# Patient Record
Sex: FEMALE
[De-identification: names, ages, dates, MRNs, and addresses within clinical notes are randomized per-mention and may not be internally consistent; named-entity substitution may affect disease eponyms.]

---

## 2017-01-19 ENCOUNTER — CHARTING TRANS (OUTPATIENT)
Dept: OTHER | Age: 42
End: 2017-01-19

## 2017-03-30 PROCEDURE — 81001 URINALYSIS AUTO W/SCOPE: CPT | Performed by: PHYSICIAN ASSISTANT

## 2017-03-30 PROCEDURE — 87086 URINE CULTURE/COLONY COUNT: CPT | Performed by: PHYSICIAN ASSISTANT

## 2017-04-15 ENCOUNTER — HOSPITAL ENCOUNTER (EMERGENCY)
Facility: HOSPITAL | Age: 42
Discharge: HOME | End: 2017-04-16
Attending: EMERGENCY MEDICINE
Payer: COMMERCIAL

## 2017-04-15 DIAGNOSIS — R29.898 LEFT ARM WEAKNESS: Primary | ICD-10-CM

## 2017-04-15 PROCEDURE — 85610 PROTHROMBIN TIME: CPT | Performed by: EMERGENCY MEDICINE

## 2017-04-15 PROCEDURE — 85025 COMPLETE CBC W/AUTO DIFF WBC: CPT | Performed by: EMERGENCY MEDICINE

## 2017-04-15 PROCEDURE — 82962 GLUCOSE BLOOD TEST: CPT

## 2017-04-15 PROCEDURE — 99285 EMERGENCY DEPT VISIT HI MDM: CPT

## 2017-04-15 PROCEDURE — 80053 COMPREHEN METABOLIC PANEL: CPT | Performed by: EMERGENCY MEDICINE

## 2017-04-15 PROCEDURE — 85730 THROMBOPLASTIN TIME PARTIAL: CPT | Performed by: EMERGENCY MEDICINE

## 2017-04-15 PROCEDURE — 96374 THER/PROPH/DIAG INJ IV PUSH: CPT

## 2017-04-15 PROCEDURE — 93010 ELECTROCARDIOGRAM REPORT: CPT

## 2017-04-15 PROCEDURE — 93005 ELECTROCARDIOGRAM TRACING: CPT

## 2017-04-15 RX ORDER — ASPIRIN 81 MG/1
324 TABLET, CHEWABLE ORAL ONCE
Status: COMPLETED | OUTPATIENT
Start: 2017-04-15 | End: 2017-04-15

## 2017-04-16 ENCOUNTER — APPOINTMENT (OUTPATIENT)
Dept: MRI IMAGING | Facility: HOSPITAL | Age: 42
End: 2017-04-16
Attending: EMERGENCY MEDICINE
Payer: COMMERCIAL

## 2017-04-16 VITALS
RESPIRATION RATE: 14 BRPM | HEART RATE: 78 BPM | HEIGHT: 70 IN | SYSTOLIC BLOOD PRESSURE: 122 MMHG | OXYGEN SATURATION: 99 % | DIASTOLIC BLOOD PRESSURE: 76 MMHG | TEMPERATURE: 98 F | BODY MASS INDEX: 24.34 KG/M2 | WEIGHT: 170 LBS

## 2017-04-16 PROCEDURE — A9575 INJ GADOTERATE MEGLUMI 0.1ML: HCPCS | Performed by: EMERGENCY MEDICINE

## 2017-04-16 PROCEDURE — 70549 MR ANGIOGRAPH NECK W/O&W/DYE: CPT

## 2017-04-16 PROCEDURE — 70553 MRI BRAIN STEM W/O & W/DYE: CPT

## 2017-04-16 PROCEDURE — 70546 MR ANGIOGRAPH HEAD W/O&W/DYE: CPT

## 2017-04-16 RX ORDER — LORAZEPAM 2 MG/ML
1 INJECTION INTRAMUSCULAR ONCE
Status: COMPLETED | OUTPATIENT
Start: 2017-04-16 | End: 2017-04-16

## 2017-04-16 NOTE — ED NOTES
Discharge instructions were reviewed and pt verbalized understanding. Pt is awake, alert, oriented, ambulatory, and denies any pain/discomfort. Pt refuses to be transported out of the ED via wheelchair.  Pt has steady gait, denies any numbness/tingling/weak

## 2017-04-16 NOTE — ED INITIAL ASSESSMENT (HPI)
Pt reports washing dishes 30 min ago and feeling a sudden weakness in the left hand and numbness in the left arm, left side of the face, and left leg. She reports the symptoms lasted for 5 seconds. Pt states her symptoms are completely resolved.

## 2017-04-16 NOTE — ED PROVIDER NOTES
Patient Seen in: [facility] Emergency Department    History   Patient presents with:  Numbness Weakness (neurologic)    Stated Complaint:     HPI    Patient states that just before coming to the hospital she was at home holding an object in the left 04/15/17 2234 82   Resp 04/15/17 2234 10   Temp 04/15/17 2234 98.3 °F (36.8 °C)   Temp src 04/15/17 2234 Temporal   SpO2 04/15/17 2234 100 %   O2 Device 04/15/17 2234 None (Room air)       Current:/76 mmHg  Pulse 78  Temp(Src) 98.2 °F (36.8 °C) (Temp Final result                 Please view results for these tests on the individual orders.    URINALYSIS WITH CULTURE REFLEX   POCT PREGNANCY, URINE   RAINBOW DRAW BLUE   RAINBOW DRAW GOLD   RAINBOW DRAW LAVENDER   RAINBOW DRAW L

## 2017-04-17 ENCOUNTER — TELEPHONE (OUTPATIENT)
Dept: NEUROLOGY | Facility: CLINIC | Age: 42
End: 2017-04-17

## 2017-04-17 NOTE — TELEPHONE ENCOUNTER
wanting an appointment today for patient who was is ED 4/15/17 for left arm weakness. Informed  there are no openings today.  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  Apologized that we will not be able to a

## 2017-04-18 NOTE — TELEPHONE ENCOUNTER
Spoke with patient's  ([id number] Mohini Falrey per [employer]). Relayed below. Offered appt with Dr Alvarado Nip, Wed, 4/19/17, 3:20 pm in the [facility] office. He accepted appointment. Patient scheduled.

## 2017-04-24 ENCOUNTER — LAB REQUISITION (OUTPATIENT)
Dept: LAB | Facility: HOSPITAL | Age: 42
End: 2017-04-24
Payer: COMMERCIAL

## 2017-04-24 ENCOUNTER — APPOINTMENT (OUTPATIENT)
Dept: LAB | Facility: HOSPITAL | Age: 42
End: 2017-04-24
Attending: Other
Payer: COMMERCIAL

## 2017-04-24 DIAGNOSIS — G45.9 TRANSIENT CEREBRAL ISCHEMIA, UNSPECIFIED TYPE: ICD-10-CM

## 2017-04-24 PROCEDURE — 83036 HEMOGLOBIN GLYCOSYLATED A1C: CPT

## 2017-04-24 PROCEDURE — 85613 RUSSELL VIPER VENOM DILUTED: CPT

## 2017-04-24 PROCEDURE — 85652 RBC SED RATE AUTOMATED: CPT

## 2017-04-24 PROCEDURE — 36415 COLL VENOUS BLD VENIPUNCTURE: CPT

## 2017-04-24 PROCEDURE — 86038 ANTINUCLEAR ANTIBODIES: CPT

## 2017-04-24 PROCEDURE — 81241 F5 GENE: CPT

## 2017-04-24 PROCEDURE — 85610 PROTHROMBIN TIME: CPT

## 2017-04-24 PROCEDURE — 86146 BETA-2 GLYCOPROTEIN ANTIBODY: CPT

## 2017-04-24 PROCEDURE — 86255 FLUORESCENT ANTIBODY SCREEN: CPT

## 2017-04-24 PROCEDURE — 86147 CARDIOLIPIN ANTIBODY EA IG: CPT

## 2017-04-24 PROCEDURE — 86141 C-REACTIVE PROTEIN HS: CPT

## 2017-04-24 PROCEDURE — 80061 LIPID PANEL: CPT

## 2017-04-24 NOTE — PROGRESS NOTES
Quick Note:    Hi Nanda,    Your hbA1C, lipid panel, and inflammatory markers came back normal. I am still awaiting the results of your other bloodwork.     Thanks  Dr. Robert Haro  ______

## 2017-04-26 NOTE — PROGRESS NOTES
Quick Note:    Doni Vee    Your HEATH came back normal. I am awaiting the results of your hypercoaguable workup.      Thanks  Dr. Chaim Castleman  ______

## 2017-04-27 NOTE — PROGRESS NOTES
Quick Note:    Doni Jasmine,    The rest of your hypercoaguable workup came back negative. There is no evidence of Factor V Leiden or Antiphospholipid Ab syndrome. Your ANCA testing was also negative.      Thanks  Dr. Raúl Sorenson  ______

## 2018-01-08 ENCOUNTER — OFFICE VISIT (OUTPATIENT)
Dept: OBGYN CLINIC | Facility: CLINIC | Age: 43
End: 2018-01-08

## 2018-01-08 VITALS
WEIGHT: 184 LBS | DIASTOLIC BLOOD PRESSURE: 70 MMHG | HEART RATE: 72 BPM | SYSTOLIC BLOOD PRESSURE: 124 MMHG | BODY MASS INDEX: 26.34 KG/M2 | HEIGHT: 70 IN | RESPIRATION RATE: 16 BRPM

## 2018-01-08 DIAGNOSIS — Z01.419 WELL WOMAN EXAM WITH ROUTINE GYNECOLOGICAL EXAM: Primary | ICD-10-CM

## 2018-01-08 DIAGNOSIS — Z12.39 SCREENING BREAST EXAMINATION: ICD-10-CM

## 2018-01-08 PROBLEM — Z30.09 EVALUATION REGARDING CONTRACEPTION OPTIONS: Status: ACTIVE | Noted: 2018-01-08

## 2018-01-08 PROCEDURE — 99396 PREV VISIT EST AGE 40-64: CPT | Performed by: OBSTETRICS & GYNECOLOGY

## 2018-01-08 PROCEDURE — 88175 CYTOPATH C/V AUTO FLUID REDO: CPT | Performed by: OBSTETRICS & GYNECOLOGY

## 2018-01-08 NOTE — PROGRESS NOTES
Sarah Cross is a 40year old female  Patient's last menstrual period was 2017 (approximate). Patient presents with:  Wellness Visit: annual. discuss BC  . Birthing control discussed.   She wishes to have a ParaGard IUD after her next juan as needed for Pain., Disp: , Rfl:     ALLERGIES:  No Known Allergies      Review of Systems:  Constitutional:  Denies fatigue, night sweats, hot flashes  Gastrointestinal:  denies heartburn, abdominal pain, diarrhea or constipation  Genitourinary:  denies

## 2018-01-10 LAB — LAST PAP RESULT: NORMAL

## 2018-11-23 ENCOUNTER — IMAGING SERVICES (OUTPATIENT)
Dept: OTHER | Age: 43
End: 2018-11-23

## 2019-02-21 ENCOUNTER — TELEPHONE (OUTPATIENT)
Dept: OBGYN CLINIC | Facility: CLINIC | Age: 44
End: 2019-02-21

## 2019-02-21 ENCOUNTER — OFFICE VISIT (OUTPATIENT)
Dept: OBGYN CLINIC | Facility: CLINIC | Age: 44
End: 2019-02-21
Payer: COMMERCIAL

## 2019-02-21 ENCOUNTER — LAB ENCOUNTER (OUTPATIENT)
Dept: LAB | Age: 44
End: 2019-02-21
Attending: OBSTETRICS & GYNECOLOGY
Payer: COMMERCIAL

## 2019-02-21 VITALS
HEIGHT: 70 IN | BODY MASS INDEX: 26.48 KG/M2 | WEIGHT: 185 LBS | HEART RATE: 85 BPM | SYSTOLIC BLOOD PRESSURE: 118 MMHG | DIASTOLIC BLOOD PRESSURE: 72 MMHG

## 2019-02-21 DIAGNOSIS — N92.6 MISSED MENSES: ICD-10-CM

## 2019-02-21 DIAGNOSIS — N92.6 IRREGULAR MENSES: ICD-10-CM

## 2019-02-21 DIAGNOSIS — N92.6 MISSED MENSES: Primary | ICD-10-CM

## 2019-02-21 LAB
B-HCG SERPL-ACNC: <1 MIU/ML
CONTROL LINE PRESENT WITH A CLEAR BACKGROUND (YES/NO): YES YES/NO
PREGNANCY TEST, URINE: NEGATIVE
TSI SER-ACNC: 2.49 MIU/ML (ref 0.36–3.74)

## 2019-02-21 PROCEDURE — 84702 CHORIONIC GONADOTROPIN TEST: CPT | Performed by: OBSTETRICS & GYNECOLOGY

## 2019-02-21 PROCEDURE — 99212 OFFICE O/P EST SF 10 MIN: CPT | Performed by: OBSTETRICS & GYNECOLOGY

## 2019-02-21 PROCEDURE — 84443 ASSAY THYROID STIM HORMONE: CPT | Performed by: OBSTETRICS & GYNECOLOGY

## 2019-02-21 PROCEDURE — 81025 URINE PREGNANCY TEST: CPT | Performed by: OBSTETRICS & GYNECOLOGY

## 2019-02-21 NOTE — PROGRESS NOTES
Her period is usually every 24 days she had unprotected intercourse and took emergency contraceptive 4 hours after intercourse. She usually uses condoms.   Has a family history of ovarian and uterine cancer does not wish to have any hormonal methods for bi

## 2019-02-25 ENCOUNTER — TELEPHONE (OUTPATIENT)
Dept: OBGYN CLINIC | Facility: CLINIC | Age: 44
End: 2019-02-25

## 2019-02-26 NOTE — TELEPHONE ENCOUNTER
Patient aware of normal results. Patient would like to know if there is medication she should take since her LMP was 1/12/2019 and her HCG is negative to induce her menses. Explained to patient that after taking Plan B her menses might be irregular.  Will c

## 2020-11-03 ENCOUNTER — HOSPITAL ENCOUNTER (OUTPATIENT)
Age: 45
Discharge: HOME | End: 2020-11-03
Payer: COMMERCIAL

## 2020-11-03 VITALS
OXYGEN SATURATION: 100 % | SYSTOLIC BLOOD PRESSURE: 158 MMHG | TEMPERATURE: 98 F | RESPIRATION RATE: 16 BRPM | HEART RATE: 77 BPM | DIASTOLIC BLOOD PRESSURE: 94 MMHG

## 2020-11-03 DIAGNOSIS — J02.9 SORE THROAT: ICD-10-CM

## 2020-11-03 DIAGNOSIS — Z20.822 EXPOSURE TO COVID-19 VIRUS: Primary | ICD-10-CM

## 2020-11-03 PROCEDURE — 87086 URINE CULTURE/COLONY COUNT: CPT | Performed by: PHYSICIAN ASSISTANT

## 2020-11-03 PROCEDURE — 99214 OFFICE O/P EST MOD 30 MIN: CPT

## 2020-11-03 PROCEDURE — 81002 URINALYSIS NONAUTO W/O SCOPE: CPT | Performed by: PHYSICIAN ASSISTANT

## 2020-11-03 PROCEDURE — 99203 OFFICE O/P NEW LOW 30 MIN: CPT

## 2020-11-03 RX ORDER — TAMSULOSIN HYDROCHLORIDE 0.4 MG/1
CAPSULE ORAL DAILY
COMMUNITY

## 2020-11-03 NOTE — ED INITIAL ASSESSMENT (HPI)
Patient presents to [address] [place] [address] for testing. 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 Worked last weekend. Sore throat started last night.

## 2020-11-03 NOTE — ED INITIAL ASSESSMENT (HPI)
Late entry patient with urinary frequency. Urine obtained and cx sent. Given filter.h/o kidney stones. Will f/u with her pcp

## 2020-11-03 NOTE — ED PROVIDER NOTES
Patient Seen in: [facility]      History   Patient presents with:  Sore Throat  Testing    Stated Complaint: sore throat,test    HPI    Mónica Albert is a 21-year-old female who presents for evaluation of sore throat.   She has a past medial hist Oral   SpO2 100 %   O2 Device None (Room air)       Current:BP (!) 182/101   Pulse 86   Temp 98.3 °F (36.8 °C) (Oral)   Resp 18   LMP 10/13/2020 (Approximate)   SpO2 100%         Physical Exam  Nursing note reviewed. Vital signs reviewed.     Constitutional negative, the patient may continue normal social distancing practices without being in quarantine. The patient is encouraged to return if any concerning symptoms arise. Additional verbal discharge instructions are given and discussed.   Discharge medicatio

## 2021-02-06 ENCOUNTER — IMMUNIZATION (OUTPATIENT)
Dept: LAB | Age: 46
End: 2021-02-06
Attending: HOSPITALIST
Payer: COMMERCIAL

## 2021-02-06 DIAGNOSIS — Z23 NEED FOR VACCINATION: Primary | ICD-10-CM

## 2021-02-06 PROCEDURE — 0001A SARSCOV2 VAC 30MCG/0.3ML IM: CPT

## 2021-02-28 ENCOUNTER — IMMUNIZATION (OUTPATIENT)
Dept: LAB | Age: 46
End: 2021-02-28
Attending: PREVENTIVE MEDICINE
Payer: COMMERCIAL

## 2021-02-28 DIAGNOSIS — Z23 NEED FOR VACCINATION: Primary | ICD-10-CM

## 2021-02-28 PROCEDURE — 0002A SARSCOV2 VAC 30MCG/0.3ML IM: CPT

## 2021-10-04 ENCOUNTER — TELEPHONE (OUTPATIENT)
Dept: ORTHOPEDICS CLINIC | Facility: CLINIC | Age: 46
End: 2021-10-04

## 2021-10-04 NOTE — TELEPHONE ENCOUNTER
Patient is requesting to see Dr. Ingris Vega for left foot pain, states she knows him personally and wants to see him before anyone else.

## 2021-10-05 ENCOUNTER — TELEPHONE (OUTPATIENT)
Dept: ORTHOPEDICS CLINIC | Facility: CLINIC | Age: 46
End: 2021-10-05

## 2021-10-05 DIAGNOSIS — M79.671 RIGHT FOOT PAIN: Primary | ICD-10-CM

## 2021-10-05 NOTE — TELEPHONE ENCOUNTER
Patient coming in for Right foot pain. Patient has not had imaging, if imaging needed please place Rx.   Future Appointments   Date Time Provider Deana Peters   10/11/2021  2:40 PM Leigh Rodriguez MD [facility]

## 2021-10-05 NOTE — TELEPHONE ENCOUNTER
Requisite for xrays  · Reviewed patients chart, xray orders are required. Order placed for right foot xrays  · Attempted to reach patient, no answer, left voicemail to arrive 30 mins prior to patients appt to complete X-ray order.  Office information left f

## 2021-10-05 NOTE — TELEPHONE ENCOUNTER
Future Appointments   Date Time Provider Deana Lorraine   10/11/2021  2:40 PM Heidi Escobar MD [facility]

## 2021-10-11 ENCOUNTER — HOSPITAL ENCOUNTER (OUTPATIENT)
Dept: GENERAL RADIOLOGY | Age: 46
Discharge: HOME | End: 2021-10-11
Attending: ORTHOPAEDIC SURGERY
Payer: COMMERCIAL

## 2021-10-11 ENCOUNTER — OFFICE VISIT (OUTPATIENT)
Dept: ORTHOPEDICS CLINIC | Facility: CLINIC | Age: 46
End: 2021-10-11
Payer: COMMERCIAL

## 2021-10-11 DIAGNOSIS — S92.531A CLOSED DISPLACED FRACTURE OF DISTAL PHALANX OF LESSER TOE OF RIGHT FOOT, INITIAL ENCOUNTER: Primary | ICD-10-CM

## 2021-10-11 DIAGNOSIS — M79.671 RIGHT FOOT PAIN: ICD-10-CM

## 2021-10-11 PROCEDURE — 99203 OFFICE O/P NEW LOW 30 MIN: CPT | Performed by: ORTHOPAEDIC SURGERY

## 2021-10-11 PROCEDURE — 73630 X-RAY EXAM OF FOOT: CPT | Performed by: ORTHOPAEDIC SURGERY

## 2021-10-17 NOTE — H&P
[facility] - ORTHOPEDICS  [facility] [place] [id number]  [phone]     NEW PATIENT VISIT - HISTORY AND PHYSICAL EXAMINATION     Name: Salma Mittal   MRN: [id number]  Date: 10/11/2021     CC: Right foot intact. Neck:      Musculoskeletal: Normal range of motion and neck supple. Cardiovascular:      Pulses: Normal pulses. Pulmonary:      Effort: Pulmonary effort is normal. No respiratory distress. Abdominal:      General: There is no distension. [email or web address]  t: [phone]  o: [phone]  f: [phone]        This note was dictated using Dragon software.   While it was briefly proofread prior to completion, some grammatical, spelling, and word choice errors due to dictation may st

## 2024-04-19 NOTE — TELEPHONE ENCOUNTER
Patient son Jeison Patient told that Dr Brian Estrada is out of town until next week. Patient also informed that she is due for her well women exam and can make an appointment to discuss her concerns. Patient scheduled appointment.

## (undated) NOTE — ED AVS SNAPSHOT
[facility] Emergency Department    [place]  [address] [address]    Phone:  [phone]    Fax:  Sujay 16   MRN: [id number]    Department:  [facility] Emergency Department   Date of Visit:  4/15/2 Insurance plans vary and the physician(s) referred by the ER may not be covered by your plan. Please contact your insurance company to determine coverage for follow-up care and referrals.     [facility]  Main [phone]  Pediatric If the emergency physician has read X-rays, these will be re-interpreted by a radiologist.  If there is a significant change in your reading, you will be contacted. Please make sure we have your correct phone number before you leave.  After you leave, you s and ask to get set up for an insurance coverage that is in-network with [employer].         Imaging Results         MRI BRAIN MRA HEAD+MRA NECK (ALL W+WO) (CPT=70553/82405/56174) (In process)       [facility] [employer]     Visit [employer]  You can access your

## (undated) NOTE — ED AVS SNAPSHOT
[facility] Emergency Department    [place]  [address] [address]    Phone:  [phone]    Fax:  Jing Parker   MRN: [id number]    Department:  [facility] Emergency Department   Date of Visit:  4/15/2 IF THERE IS ANY CHANGE OR WORSENING OF YOUR CONDITION, CALL YOUR PRIMARY CARE PHYSICIAN AT ONCE OR RETURN IMMEDIATELY TO THE EMERGENCY DEPARTMENT.     If you have been prescribed any medication(s), please fill your prescription right away and begin taking t